# Patient Record
Sex: FEMALE | Race: WHITE
[De-identification: names, ages, dates, MRNs, and addresses within clinical notes are randomized per-mention and may not be internally consistent; named-entity substitution may affect disease eponyms.]

---
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
## 2025-01-10 ENCOUNTER — OFFICE (OUTPATIENT)
Dept: URBAN - METROPOLITAN AREA CLINIC 104 | Facility: CLINIC | Age: 20
Setting detail: OPHTHALMOLOGY
End: 2025-01-10
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
## 2025-01-10 PROCEDURE — 10004 FNA BX W/O IMG GDN EA ADDL: CPT | Performed by: OPHTHALMOLOGY

## 2025-08-21 ENCOUNTER — OFFICE (OUTPATIENT)
Facility: LOCATION | Age: 20
Setting detail: OPHTHALMOLOGY
End: 2025-08-21
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
## 2025-08-21 PROCEDURE — 10004 FNA BX W/O IMG GDN EA ADDL: CPT | Performed by: OPHTHALMOLOGY
